# Patient Record
Sex: MALE | Race: WHITE
[De-identification: names, ages, dates, MRNs, and addresses within clinical notes are randomized per-mention and may not be internally consistent; named-entity substitution may affect disease eponyms.]

---

## 2018-07-07 ENCOUNTER — HOSPITAL ENCOUNTER (INPATIENT)
Dept: HOSPITAL 11 - JP.ED | Age: 43
LOS: 1 days | Discharge: HOME | DRG: 342 | End: 2018-07-08
Attending: SURGERY | Admitting: SURGERY
Payer: COMMERCIAL

## 2018-07-07 DIAGNOSIS — E87.2: ICD-10-CM

## 2018-07-07 DIAGNOSIS — K35.80: Primary | ICD-10-CM

## 2018-07-07 DIAGNOSIS — N17.9: ICD-10-CM

## 2018-07-07 PROCEDURE — 0DTJ4ZZ RESECTION OF APPENDIX, PERCUTANEOUS ENDOSCOPIC APPROACH: ICD-10-PCS | Performed by: SURGERY

## 2018-07-07 RX ADMIN — HYDROCODONE BITARTRATE AND ACETAMINOPHEN PRN TAB: 5; 325 TABLET ORAL at 20:52

## 2018-07-07 NOTE — EDM.PDOC
ED HPI GENERAL MEDICAL PROBLEM





- General


Chief Complaint: Abdominal Pain


Stated Complaint: RT SIDE ABD PAIN/NAUSEA


Time Seen by Provider: 07/07/18 11:21


Source of Information: Reports: Patient, Family, RN Notes Reviewed


History Limitations: Reports: No Limitations





- History of Present Illness


INITIAL COMMENTS - FREE TEXT/NARRATIVE: 





42-year-old gentleman presents to the emergency department today complaint of 

right lower quadrant pain, he states the pain started early this morning it is 

very intense he is nauseated with this pain no shortness of breath no chest 

pains no other complaints no other  GI symptoms no history of abdominal 

surgeries





- Related Data


 Allergies











Allergy/AdvReac Type Severity Reaction Status Date / Time


 


No Known Allergies Allergy   Verified 07/07/18 11:15











Home Meds: 


 Home Meds





NK [No Known Home Meds]  07/07/18 [History]











Past Medical History





- Past Health History


Medical/Surgical History: Denies Medical/Surgical History





Social & Family History





- Tobacco Use


Smoking Status *Q: Never Smoker





ED ROS GENERAL





- Review of Systems


Review Of Systems: See Below


Constitutional: Reports: No Symptoms


Respiratory: Reports: No Symptoms


Cardiovascular: Reports: No Symptoms


GI/Abdominal: Reports: Abdominal Pain, Flatus, Nausea.  Denies: Constipation, 

Diarrhea, Vomiting


: Reports: No Symptoms





ED EXAM, GI/ABD





- Physical Exam


Exam: See Below


Exam Limited By: No Limitations


General Appearance: Alert, Mild Distress


Respiratory/Chest: No Respiratory Distress, Lungs Clear, Normal Breath Sounds, 

No Accessory Muscle Use


Cardiovascular: Regular Rate, Rhythm, No Murmur


GI/Abdominal Exam: Normal Bowel Sounds, Soft (None), Tender (Tender right lower 

quadrant, psoas sign, obturator sign, heeltap sign all positive), Other (Obese)

.  No: Guarding, Rigid, Rebound





Course





- Vital Signs


Last Recorded V/S: 


 Last Vital Signs











Temp  97.2 F   07/07/18 11:18


 


Pulse  83   07/07/18 11:18


 


Resp  20   07/07/18 11:18


 


BP  158/96 H  07/07/18 11:18


 


Pulse Ox  98   07/07/18 11:18














- Orders/Labs/Meds


Orders: 


 Active Orders 24 hr











 Category Date Time Status


 


 Peripheral IV Care [RC] .AS DIRECTED Care  07/07/18 11:24 Active


 


 Abdomen Pelvis w Cont [CT] Urgent Exams  07/07/18 11:24 Taken


 


 UA W/MICROSCOPIC [URIN] Urgent Lab  07/07/18 13:16 Ordered


 


 Iopamidol [Isovue-300 (61%)] Med  07/07/18 12:00 Active





 150 ml IV .AS DIRECTED   


 


 Lactated Ringers [Ringers, Lactated] 1,000 ml Med  07/07/18 11:30 Active





 IV ASDIRECTED   


 


 Sodium Chloride 0.9% [Saline Flush] Med  07/07/18 11:24 Active





 10 ml FLUSH ASDIRECTED PRN   


 


 cefOXitin [Mefoxin] 2 gm Med  07/07/18 13:24 Ordered





 Sodium Chloride 0.9% [Normal Saline] 50 ml   





 IV ONETIME   


 


 Peripheral IV Insertion Adult [OM.PC] Urgent Oth  07/07/18 11:24 Ordered








 Medication Orders





Lactated Ringer's (Ringers, Lactated)  1,000 mls @ 999 mls/hr IV ASDIRECTED PATRICIA


   Last Admin: 07/07/18 12:18  Dose: 999 mls/hr


Cefoxitin Sodium 2 gm/ Sodium (Chloride)  50 mls @ 100 mls/hr IV ONETIME ONE


   Stop: 07/07/18 13:53


Iopamidol (Isovue-300 (61%))  150 ml IV .AS DIRECTED PATRICIA


   Stop: 07/07/18 23:00


   Last Admin: 07/07/18 12:19  Dose: 150 ml


Sodium Chloride (Saline Flush)  10 ml FLUSH ASDIRECTED PRN


   PRN Reason: Keep Vein Open


   Last Admin: 07/07/18 11:43  Dose: 10 ml








Labs: 


 Laboratory Tests











  07/07/18 07/07/18 07/07/18 Range/Units





  11:24 11:24 11:24 


 


WBC  13.6 H    (4.5-11.0)  K/uL


 


RBC  5.77    (4.30-5.90)  M/uL


 


Hgb  14.7    (12.0-15.0)  g/dL


 


Hct  44.1    (40.0-54.0)  %


 


MCV  76 L    (80-98)  fL


 


MCH  26 L    (27-31)  pg


 


MCHC  33    (32-36)  %


 


Plt Count  303    (150-400)  K/uL


 


Neut % (Auto)  80 H    (36-66)  %


 


Lymph % (Auto)  13 L    (24-44)  %


 


Mono % (Auto)  6    (2-6)  %


 


Eos % (Auto)  1 L    (2-4)  %


 


Baso % (Auto)  0    (0-1)  %


 


Sodium   140   (140-148)  mmol/L


 


Potassium   4.3   (3.6-5.2)  mmol/L


 


Chloride   105   (100-108)  mmol/L


 


Carbon Dioxide   29   (21-32)  mmol/L


 


Anion Gap   6.0   (5.0-14.0)  mmol/L


 


BUN   14   (7-18)  mg/dL


 


Creatinine   1.4 H   (0.8-1.3)  mg/dL


 


Est Cr Clr Drug Dosing   79.27   mL/min


 


Estimated GFR (MDRD)   56 L   (>60)  


 


Glucose   116 H   ()  mg/dL


 


Lactic Acid    2.3 H  (0.4-2.0)  mmol/L


 


Calcium   8.6   (8.5-10.1)  mg/dL


 


Total Bilirubin   0.6   (0.2-1.0)  mg/dL


 


AST   20   (15-37)  U/L


 


ALT   40   (12-78)  U/L


 


Alkaline Phosphatase   72   ()  U/L


 


Troponin I   < 0.017   (0.000-0.056)  ng/mL


 


Total Protein   6.9   (6.4-8.2)  g/dL


 


Albumin   3.4   (3.4-5.0)  g/dL


 


Globulin   3.5   (2.3-3.5)  g/dL


 


Albumin/Globulin Ratio   1.0 L   (1.2-2.2)  


 


Lipase   80   ()  U/L


 


Urine Color     


 


Urine Appearance     


 


Urine pH     (4.5-8.0)  


 


Ur Specific Gravity     (1.008-1.030)  


 


Urine Protein     (NEGATIVE)  mg/dL


 


Urine Glucose (UA)     (NEGATIVE)  mg/dL


 


Urine Ketones     (NEGATIVE)  mg/dL


 


Urine Occult Blood     (NEGATIVE)  


 


Urine Nitrite     (NEGAITVE)  


 


Urine Bilirubin     (NEGATIVE)  


 


Urine Urobilinogen     (NORMAL)  mg/dL


 


Ur Leukocyte Esterase     (NEGATIVE)  


 


Urine RBC     (0-5)  


 


Urine WBC     (0-5)  


 


Ur Epithelial Cells     


 


Amorphous Sediment     


 


Urine Bacteria     


 


Urine Mucus     














  07/07/18 Range/Units





  13:16 


 


WBC   (4.5-11.0)  K/uL


 


RBC   (4.30-5.90)  M/uL


 


Hgb   (12.0-15.0)  g/dL


 


Hct   (40.0-54.0)  %


 


MCV   (80-98)  fL


 


MCH   (27-31)  pg


 


MCHC   (32-36)  %


 


Plt Count   (150-400)  K/uL


 


Neut % (Auto)   (36-66)  %


 


Lymph % (Auto)   (24-44)  %


 


Mono % (Auto)   (2-6)  %


 


Eos % (Auto)   (2-4)  %


 


Baso % (Auto)   (0-1)  %


 


Sodium   (140-148)  mmol/L


 


Potassium   (3.6-5.2)  mmol/L


 


Chloride   (100-108)  mmol/L


 


Carbon Dioxide   (21-32)  mmol/L


 


Anion Gap   (5.0-14.0)  mmol/L


 


BUN   (7-18)  mg/dL


 


Creatinine   (0.8-1.3)  mg/dL


 


Est Cr Clr Drug Dosing   mL/min


 


Estimated GFR (MDRD)   (>60)  


 


Glucose   ()  mg/dL


 


Lactic Acid   (0.4-2.0)  mmol/L


 


Calcium   (8.5-10.1)  mg/dL


 


Total Bilirubin   (0.2-1.0)  mg/dL


 


AST   (15-37)  U/L


 


ALT   (12-78)  U/L


 


Alkaline Phosphatase   ()  U/L


 


Troponin I   (0.000-0.056)  ng/mL


 


Total Protein   (6.4-8.2)  g/dL


 


Albumin   (3.4-5.0)  g/dL


 


Globulin   (2.3-3.5)  g/dL


 


Albumin/Globulin Ratio   (1.2-2.2)  


 


Lipase   ()  U/L


 


Urine Color  Yellow  


 


Urine Appearance  Clear  


 


Urine pH  5.0  (4.5-8.0)  


 


Ur Specific Gravity  1.000 L  (1.008-1.030)  


 


Urine Protein  Negative  (NEGATIVE)  mg/dL


 


Urine Glucose (UA)  Normal  (NEGATIVE)  mg/dL


 


Urine Ketones  Negative  (NEGATIVE)  mg/dL


 


Urine Occult Blood  Negative  (NEGATIVE)  


 


Urine Nitrite  Negative  (NEGAITVE)  


 


Urine Bilirubin  Negative  (NEGATIVE)  


 


Urine Urobilinogen  Normal  (NORMAL)  mg/dL


 


Ur Leukocyte Esterase  Negative  (NEGATIVE)  


 


Urine RBC  Not seen  (0-5)  


 


Urine WBC  Not seen  (0-5)  


 


Ur Epithelial Cells  Not seen  


 


Amorphous Sediment  Rare  


 


Urine Bacteria  Not seen  


 


Urine Mucus  Rare  











Meds: 


Medications











Generic Name Dose Route Start Last Admin





  Trade Name Freq  PRN Reason Stop Dose Admin


 


Lactated Ringer's  1,000 mls @ 999 mls/hr  07/07/18 11:30  07/07/18 12:18





  Ringers, Lactated  IV   999 mls/hr





  ASDIRECTED PATRICIA   Administration





     





     





     





     


 


Cefoxitin Sodium 2 gm/ Sodium  50 mls @ 100 mls/hr  07/07/18 13:24  





  Chloride  IV  07/07/18 13:53  





  ONETIME ONE   





     





     





     





     


 


Iopamidol  150 ml  07/07/18 12:00  07/07/18 12:19





  Isovue-300 (61%)  IV  07/07/18 23:00  150 ml





  .AS DIRECTED PATRICIA   Administration





     





     





     





     


 


Sodium Chloride  10 ml  07/07/18 11:24  07/07/18 11:43





  Saline Flush  FLUSH   10 ml





  ASDIRECTED PRN   Administration





  Keep Vein Open   





     





     





     














Discontinued Medications














Generic Name Dose Route Start Last Admin





  Trade Name Freq  PRN Reason Stop Dose Admin


 


Fentanyl  50 mcg  07/07/18 12:30  07/07/18 12:38





  Sublimaze  IVPUSH  07/07/18 12:31  50 mcg





  ONETIME ONE   Administration





     





     





     





     


 


Sodium Chloride  100 mls @ 3.5 mls/sec  07/07/18 11:56  07/07/18 12:19





  Normal Saline  IV  07/07/18 11:57  3.5 mls/sec





  ONETIME ONE   Administration





     





     





     





     


 


Ketorolac Tromethamine  30 mg  07/07/18 11:24  07/07/18 11:42





  Toradol  IVPUSH  07/07/18 11:25  30 mg





  ONETIME ONE   Administration





     





     





     





     


 


Ondansetron HCl  4 mg  07/07/18 11:24  07/07/18 11:42





  Zofran  IVPUSH  07/07/18 11:25  4 mg





  ONETIME ONE   Administration





     





     





     





     


 


Sodium Chloride  10 ml  07/07/18 11:56  07/07/18 12:19





  Saline Flush  FLUSH  07/07/18 11:57  10 ml





  ONETIME ONE   Administration





     





     





     





     














Departure





- Departure


Time of Disposition: 13:31


Disposition: Admitted As Inpatient 66


Condition: Good


Clinical Impression: 


Appendicitis


Qualifiers:


 Appendicitis type: acute appendicitis Acute appendicitis type: with localized 

peritonitis Qualified Code(s): K35.3 - Acute appendicitis with localized 

peritonitis








- Discharge Information


Referrals: 


PCP,None [Primary Care Provider] - 


Forms:  ED Department Discharge





- My Orders


Last 24 Hours: 


My Active Orders





07/07/18 11:24


Peripheral IV Care [RC] .AS DIRECTED 


Abdomen Pelvis w Cont [CT] Urgent 


Sodium Chloride 0.9% [Saline Flush]   10 ml FLUSH ASDIRECTED PRN 


Peripheral IV Insertion Adult [OM.PC] Urgent 





07/07/18 11:30


Lactated Ringers [Ringers, Lactated] 1,000 ml IV ASDIRECTED 





07/07/18 12:00


Iopamidol [Isovue-300 (61%)]   150 ml IV .AS DIRECTED 





07/07/18 13:16


UA W/MICROSCOPIC [URIN] Urgent 





07/07/18 13:24


cefOXitin [Mefoxin] 2 gm   Sodium Chloride 0.9% [Normal Saline] 50 ml IV 

ONETIME 














- Assessment/Plan


Last 24 Hours: 


My Active Orders





07/07/18 11:24


Peripheral IV Care [RC] .AS DIRECTED 


Abdomen Pelvis w Cont [CT] Urgent 


Sodium Chloride 0.9% [Saline Flush]   10 ml FLUSH ASDIRECTED PRN 


Peripheral IV Insertion Adult [OM.PC] Urgent 





07/07/18 11:30


Lactated Ringers [Ringers, Lactated] 1,000 ml IV ASDIRECTED 





07/07/18 12:00


Iopamidol [Isovue-300 (61%)]   150 ml IV .AS DIRECTED 





07/07/18 13:16


UA W/MICROSCOPIC [URIN] Urgent 





07/07/18 13:24


cefOXitin [Mefoxin] 2 gm   Sodium Chloride 0.9% [Normal Saline] 50 ml IV 

ONETIME 











Plan: 





Assessment





Acuity = acute





Site and laterality = acute appendicitis  





Etiology  = unknown  





Manifestations = abdominal pain, nausea  





Location of injury =  Home





Lab values = WBC elevated at 13.6 consistent leukocytosis, creatinine elevated 

at 1.4 consistent acute renal failure stage GIII a lactic acid elevated 2.3 

consistent with lactic acidosis, CT scan describes the appendicitis above, 

troponin was negative  





Plan


Called discussed case Dr. Jones he kindly agreed to come and evaluate the 

patient emergency department, patient was given 2 g Mefoxin in the ED as well 

as 1 L of lactated Ringer's, 4 mg Zofran and 1 mg Dilaudid with 30 mg Toradol  

















 This note was dictated using dragon voice recognition software please call 

with any questions on syntax or grammar.

## 2018-07-08 RX ADMIN — HYDROCODONE BITARTRATE AND ACETAMINOPHEN PRN TAB: 5; 325 TABLET ORAL at 01:16

## 2018-07-08 RX ADMIN — HYDROCODONE BITARTRATE AND ACETAMINOPHEN PRN TAB: 5; 325 TABLET ORAL at 08:11

## 2018-07-08 NOTE — PCM.DCSUM1
**Discharge Summary





- Hospital Course


Free Text/Narrative:: 





This 42 year old white male from [place], [place] presented to the ER yesterday 

after several hours of right lower quadrant abdominal pain followed by onset of 

nausea without vomiting.  He was afebrile.  He was tender in the right lower 

quadrant with peritoneal irritation signs.  He had an elevated WBC of 13,600. 

CT of abdomen and pelvis was consistent with acute appendicitis.  He received 

Mefoxin 2 grams IV and taken to the OR for a laparoscopic appendectomy.  Non-

perforated acute appendicitis was found.  He is doing well post operatively.  

He is eating well, is afebrile, his WBC is now normal and he is discharged at 

this time in good condition.  


Brief History: See above narrative.


Diagnosis: Stroke: No





- Discharge Data


Discharge Date: 07/08/18


Discharge Disposition: Home, Self-Care 01


Condition: Good





- Discharge Diagnosis/Problem(s)


(1) Appendicitis


SNOMED Code(s): 87063303


   ICD Code: K37 - UNSPECIFIED APPENDICITIS   Status: Acute   Current Visit: 

Yes   


Qualifiers: 


   Appendicitis type: acute appendicitis   Acute appendicitis type: with 

localized peritonitis   Qualified Code(s): K35.3 - Acute appendicitis with 

localized peritonitis   





- Patient Summary/Data


Operative Procedure(s) Performed: Laparoscopic appendectomy.


Consults: 


 Consultations





07/07/18 15:59


Respiratory Care Assess and Treatment [CONS] Routine 


   Comment: 


   Physician Instructions: Post-Op Pneumonia Prevention











Hospital Course: 





See above narrative. 





- Patient Instructions


Diet: Usual Diet as Tolerated


Activity: As Tolerated (Avoid activity that causes discomfort. )


Driving: Do Not Drive (Do not drive while taking narcotic pain medication. )


Showering/Bathing: May Shower


Notify Provider of: Fever, Increased Pain, Swelling and Redness, Drainage, 

Nausea and/or Vomiting





- Discharge Plan


Prescriptions/Med Rec: 


Acetaminophen/HYDROcodone [Norco 325-5 MG] 1 - 2 tab PO Q4H PRN #30 tablet


 PRN Reason: Pain (Moderate 4-6)


Home Medications: 


 Home Meds





Acetaminophen/HYDROcodone [Norco 325-5 MG] 1 - 2 tab PO Q4H PRN #30 tablet 07/08 /18 [Rx]








Patient Handouts:  Appendicitis, Laparoscopic Appendectomy, Adult, Care After, 

Laparoscopic Appendectomy, Adult, Care After, Easy-to-Read, Laparoscopic 

Appendectomy, Adult, Appendicitis, Easy-to-Read


Forms:  ED Department Discharge


Referrals: 


PCP,None [Primary Care Provider] -  (See his primary care provider in about two 

weeks in his home area of [place], [place]. )





- Discharge Summary/Plan Comment


DC Time >30 min.: Yes


Discharge Summary/Plan Comment: 





See above narrative. 





- Patient Data


Vitals - Most Recent: 


 Last Vital Signs











Temp  96.8 F   07/08/18 07:32


 


Pulse  73   07/08/18 07:32


 


Resp  16   07/08/18 07:32


 


BP  116/73   07/08/18 07:32


 


Pulse Ox  94 L  07/08/18 07:32











Weight - Most Recent: 280 lb


I&O - Last 24 hours: 


 Intake & Output











 07/07/18 07/08/18 07/08/18





 22:59 06:59 14:59


 


Intake Total 840 1670 480


 


Output Total 250  


 


Balance 590 1670 480











Lab Results - Last 24 hrs: 


 Laboratory Results - last 24 hr











  07/07/18 07/07/18 07/07/18 Range/Units





  11:24 11:24 11:24 


 


WBC  13.6 H    (4.5-11.0)  K/uL


 


RBC  5.77    (4.30-5.90)  M/uL


 


Hgb  14.7    (12.0-15.0)  g/dL


 


Hct  44.1    (40.0-54.0)  %


 


MCV  76 L    (80-98)  fL


 


MCH  26 L    (27-31)  pg


 


MCHC  33    (32-36)  %


 


Plt Count  303    (150-400)  K/uL


 


Neut % (Auto)  80 H    (36-66)  %


 


Lymph % (Auto)  13 L    (24-44)  %


 


Mono % (Auto)  6    (2-6)  %


 


Eos % (Auto)  1 L    (2-4)  %


 


Baso % (Auto)  0    (0-1)  %


 


Sodium   140   (140-148)  mmol/L


 


Potassium   4.3   (3.6-5.2)  mmol/L


 


Chloride   105   (100-108)  mmol/L


 


Carbon Dioxide   29   (21-32)  mmol/L


 


Anion Gap   6.0   (5.0-14.0)  mmol/L


 


BUN   14   (7-18)  mg/dL


 


Creatinine   1.4 H   (0.8-1.3)  mg/dL


 


Est Cr Clr Drug Dosing   79.27   mL/min


 


Estimated GFR (MDRD)   56 L   (>60)  


 


Glucose   116 H   ()  mg/dL


 


Lactic Acid    2.3 H  (0.4-2.0)  mmol/L


 


Calcium   8.6   (8.5-10.1)  mg/dL


 


Total Bilirubin   0.6   (0.2-1.0)  mg/dL


 


AST   20   (15-37)  U/L


 


ALT   40   (12-78)  U/L


 


Alkaline Phosphatase   72   ()  U/L


 


Troponin I   < 0.017   (0.000-0.056)  ng/mL


 


Total Protein   6.9   (6.4-8.2)  g/dL


 


Albumin   3.4   (3.4-5.0)  g/dL


 


Globulin   3.5   (2.3-3.5)  g/dL


 


Albumin/Globulin Ratio   1.0 L   (1.2-2.2)  


 


Lipase   80   ()  U/L


 


Urine Color     


 


Urine Appearance     


 


Urine pH     (4.5-8.0)  


 


Ur Specific Gravity     (1.008-1.030)  


 


Urine Protein     (NEGATIVE)  mg/dL


 


Urine Glucose (UA)     (NEGATIVE)  mg/dL


 


Urine Ketones     (NEGATIVE)  mg/dL


 


Urine Occult Blood     (NEGATIVE)  


 


Urine Nitrite     (NEGAITVE)  


 


Urine Bilirubin     (NEGATIVE)  


 


Urine Urobilinogen     (NORMAL)  mg/dL


 


Ur Leukocyte Esterase     (NEGATIVE)  


 


Urine RBC     (0-5)  


 


Urine WBC     (0-5)  


 


Ur Epithelial Cells     


 


Amorphous Sediment     


 


Urine Bacteria     


 


Urine Mucus     














  07/07/18 07/08/18 07/08/18 Range/Units





  13:16 05:06 05:06 


 


WBC   9.7   (4.5-11.0)  K/uL


 


RBC   5.22   (4.30-5.90)  M/uL


 


Hgb   12.6  D   (12.0-15.0)  g/dL


 


Hct   40.1   (40.0-54.0)  %


 


MCV   77 L   (80-98)  fL


 


MCH   24 L   (27-31)  pg


 


MCHC   31 L   (32-36)  %


 


Plt Count   281   (150-400)  K/uL


 


Neut % (Auto)     (36-66)  %


 


Lymph % (Auto)     (24-44)  %


 


Mono % (Auto)     (2-6)  %


 


Eos % (Auto)     (2-4)  %


 


Baso % (Auto)     (0-1)  %


 


Sodium    137 L  (140-148)  mmol/L


 


Potassium    4.3  (3.6-5.2)  mmol/L


 


Chloride    102  (100-108)  mmol/L


 


Carbon Dioxide    25  (21-32)  mmol/L


 


Anion Gap    14.3 H  (5.0-14.0)  mmol/L


 


BUN    13  (7-18)  mg/dL


 


Creatinine    1.2  (0.8-1.3)  mg/dL


 


Est Cr Clr Drug Dosing    92.48  mL/min


 


Estimated GFR (MDRD)    > 60  (>60)  


 


Glucose    132 H  ()  mg/dL


 


Lactic Acid     (0.4-2.0)  mmol/L


 


Calcium    8.5  (8.5-10.1)  mg/dL


 


Total Bilirubin     (0.2-1.0)  mg/dL


 


AST     (15-37)  U/L


 


ALT     (12-78)  U/L


 


Alkaline Phosphatase     ()  U/L


 


Troponin I     (0.000-0.056)  ng/mL


 


Total Protein     (6.4-8.2)  g/dL


 


Albumin     (3.4-5.0)  g/dL


 


Globulin     (2.3-3.5)  g/dL


 


Albumin/Globulin Ratio     (1.2-2.2)  


 


Lipase     ()  U/L


 


Urine Color  Yellow    


 


Urine Appearance  Clear    


 


Urine pH  5.0    (4.5-8.0)  


 


Ur Specific Gravity  1.000 L    (1.008-1.030)  


 


Urine Protein  Negative    (NEGATIVE)  mg/dL


 


Urine Glucose (UA)  Normal    (NEGATIVE)  mg/dL


 


Urine Ketones  Negative    (NEGATIVE)  mg/dL


 


Urine Occult Blood  Negative    (NEGATIVE)  


 


Urine Nitrite  Negative    (NEGAITVE)  


 


Urine Bilirubin  Negative    (NEGATIVE)  


 


Urine Urobilinogen  Normal    (NORMAL)  mg/dL


 


Ur Leukocyte Esterase  Negative    (NEGATIVE)  


 


Urine RBC  Not seen    (0-5)  


 


Urine WBC  Not seen    (0-5)  


 


Ur Epithelial Cells  Not seen    


 


Amorphous Sediment  Rare    


 


Urine Bacteria  Not seen    


 


Urine Mucus  Rare    











Med Orders - Current: 


 Current Medications





Hydrocodone Bitart/Acetaminophen (Norco 325-5 Mg)  2 tab PO Q4H PRN


   PRN Reason: Pain (moderate 4-6)


   Last Admin: 07/08/18 08:11 Dose:  2 tab


Fentanyl (Sublimaze)  25 mcg IVPUSH Q1H PRN


   PRN Reason: Pain (moderate 4-6)


   Last Admin: 07/07/18 19:23 Dose:  25 mcg


Hydroxyzine HCl (Vistaril)  50 mg IM Q4H PRN


   PRN Reason: Nausea


Lactated Ringer's (Ringers, Lactated)  1,000 mls @ 125 mls/hr IV ASDIRECTED [facility]


   Last Admin: 07/08/18 04:07 Dose:  125 mls/hr


Ondansetron HCl (Zofran)  4 mg IVPUSH Q6H PRN


   PRN Reason: Nausea/Vomiting


Sodium Chloride (Saline Flush)  10 ml FLUSH ASDIRECTED PRN


   PRN Reason: Keep Vein Open


   Last Admin: 07/07/18 11:43 Dose:  10 ml





Discontinued Medications





Bupivacaine HCl (Marcaine 0.5%) Confirm Administered Dose 50 ml .ROUTE .STK-MED 

ONE


   Stop: 07/07/18 14:39


   Last Admin: 07/07/18 14:54 Dose:  20 ml


Dexamethasone (Dexamethasone) Confirm Administered Dose 4 mg .ROUTE .STK-MED ONE


   Stop: 07/07/18 14:09


Fentanyl (Sublimaze)  50 mcg IVPUSH ONETIME ONE


   Stop: 07/07/18 12:31


   Last Admin: 07/07/18 12:38 Dose:  50 mcg


Fentanyl (Sublimaze) Confirm Administered Dose 250 mcg .ROUTE .STK-MED ONE


   Stop: 07/07/18 14:09


Fentanyl (Sublimaze)  50 mcg IVPUSH ONETIME ONE


   Stop: 07/07/18 14:15


   Last Admin: 07/07/18 16:00 Dose:  50 mcg


Fentanyl (Sublimaze) Confirm Administered Dose 250 mcg .ROUTE .STK-MED ONE


   Stop: 07/07/18 14:40


Glycopyrrolate (Robinul) Confirm Administered Dose 1 mg .ROUTE .STK-MED ONE


   Stop: 07/07/18 14:09


Lactated Ringer's (Ringers, Lactated)  1,000 mls @ 999 mls/hr IV ASDIRECTED [facility] hospital


   Last Admin: 07/07/18 12:18 Dose:  999 mls/hr


Sodium Chloride (Normal Saline)  100 mls @ 3.5 mls/sec IV ONETIME ONE


   Stop: 07/07/18 11:57


   Last Admin: 07/07/18 12:19 Dose:  3.5 mls/sec


Cefoxitin Sodium 2 gm/ Sodium (Chloride)  50 mls @ 100 mls/hr IV ONETIME ONE


   Stop: 07/07/18 14:44


   Last Admin: 07/07/18 14:10 Dose:  100 mls/hr


Lactated Ringer's (Ringers, Lactated) Confirm Administered Dose 1,000 mls @ as 

directed .ROUTE .STK-MED ONE


   Stop: 07/07/18 14:43


Iopamidol (Isovue-300 (61%))  150 ml IV .AS DIRECTED PATRICIA


   Stop: 07/07/18 23:00


   Last Admin: 07/07/18 12:19 Dose:  150 ml


Ketorolac Tromethamine (Toradol)  30 mg IVPUSH ONETIME ONE


   Stop: 07/07/18 11:25


   Last Admin: 07/07/18 11:42 Dose:  30 mg


Lidocaine HCl (Xylocaine 2%) Confirm Administered Dose 100 mg .ROUTE .STK-MED 

ONE


   Stop: 07/07/18 15:18


Lidocaine/Epinephrine (Xylocaine 1% With Epinephrine 1:100,000) Confirm 

Administered Dose 50 ml .ROUTE .STK-MED ONE


   Stop: 07/07/18 14:39


   Last Admin: 07/07/18 14:55 Dose:  20 ml


Neostigmine Methylsulfate (Neostigmine) Confirm Administered Dose 5 mg .ROUTE 

.STK-MED ONE


   Stop: 07/07/18 14:09


Ondansetron HCl (Zofran)  4 mg IVPUSH ONETIME ONE


   Stop: 07/07/18 11:25


   Last Admin: 07/07/18 11:42 Dose:  4 mg


Ondansetron HCl (Zofran) Confirm Administered Dose 4 mg .ROUTE .STK-MED ONE


   Stop: 07/07/18 14:09


Propofol (Diprivan  20 Ml) Confirm Administered Dose 200 mg .ROUTE .STK-MED ONE


   Stop: 07/07/18 14:09


Rocuronium Bromide (Zemuron) Confirm Administered Dose 50 mg .ROUTE .STK-MED ONE


   Stop: 07/07/18 14:09


Sodium Chloride (Saline Flush)  10 ml FLUSH ONETIME ONE


   Stop: 07/07/18 11:57


   Last Admin: 07/07/18 12:19 Dose:  10 ml


Succinylcholine Chloride (Quelicin) Confirm Administered Dose 200 mg .ROUTE .STK

-MED ONE


   Stop: 07/07/18 14:09

## 2018-07-09 NOTE — OR
DATE OF PROCEDURE:  07/07/2018

 

PREOPERATIVE DIAGNOSIS:  Acute appendicitis.

 

POSTOPERATIVE DIAGNOSIS:  Acute nonperforated appendicitis.

 

PROCEDURE:  Laparoscopic appendectomy.

 

SURGEON:  Kris Jones MD.

 

ANESTHESIA:  General endotracheal.

 

INDICATION:  This 42-year-old white male from the [facility], noted early this 
morning

onset of abdominal pain, primarily in his right lower quadrant.  This was 
followed by

nausea.  He felt like he could vomit, but did not.  He presented to the 
emergency room.  He

was found to be afebrile, tender in the right lower quadrant with localized 
peritoneal

irritation signs.  White count was 13,600.  He underwent a CAT scan of his 
abdomen and

pelvis, which was consistent with acute appendicitis.  He was taken to the 
operating room

for a laparoscopic appendectomy and received Mefoxin 2 g IV in the immediate 
preoperative 

period.  I counseled him for surgery, including risks and alternatives, and he 
gave his informed

consent to proceed.

 

DESCRIPTION OF PROCEDURE:  After adequate general endotracheal anesthesia was 
obtained, 

a Rodriguez catheter was placed.  The leg compression stockings were in place and 
used during 

the entire procedure.  His abdomen was prepped and draped in the usual sterile 
fashion.  Time-

out was held.  An infraumbilical semicircular incision was made.  Under direct 
vision, a 12-

mm port was introduced in the abdomen through this incision.  The camera was 
introduced into

the abdomen and the abdomen was insufflated to a pressure of 20 mmHg with 
carbon dioxide.

No evidence of intraabdominal injury was seen.  Under direct vision, 12 mm 
ports were placed

in the right upper and left lower quadrants.  We were able to mobilize up the 
appendix which 

was noted to be markedly distended with an exudate and some gray areas on it 
consistent with

acute nonperforated appendicitis.  The base of appendix was divided with the 
endoscopic EMILE

using a blue load.  The mesoappendix was divided with the endoscopic EMILE using 
a white 

load. The appendix was placed in a sample retrieval bag and elevated up through 
the anterior

abdominal wall via the right upper quadrant port site.  To accomplish this we 
had to enlarge

the incision.  The appendix was delivered from the field.  The right upper 
quadrant port was

reintroduced back into the abdomen.  Hemostasis was obtained with 
electrocautery.  All

looked well.  The fascial closure device was used to place 0 Vicryl stitches in 
the right

upper and left lower quadrant fascial defects.  After they were both placed, 
they were tied

down.  The infraumbilical port was removed with an interrupted stitch of 0 
Vicryl used to

close this fascial defect.  Before tying the stitch down, we evacuated as much 
CO2 as we

could from the abdomen. Lidocaine 1% in a 50:50 mix with 0.5% Marcaine with 
epinephrine 

was infiltrated about all incisions.  4-0 Vicryl using a subcuticular stitch 
was placed to 

approximate the skin incisions.  Dermabond was applied.  The anesthesia was 
reversed.

He was extubated and brought to recovery room in good condition.  His Rodriguez 
catheter was

removed before he got to the recovery room.

 

 

 

 

Kris Jones MD

DD:  07/07/2018 16:13:00

DT:  07/07/2018 16:38:07

Job #:  [id number]

[place]